# Patient Record
Sex: FEMALE | Race: OTHER | HISPANIC OR LATINO
[De-identification: names, ages, dates, MRNs, and addresses within clinical notes are randomized per-mention and may not be internally consistent; named-entity substitution may affect disease eponyms.]

---

## 2022-01-06 PROBLEM — Z00.00 ENCOUNTER FOR PREVENTIVE HEALTH EXAMINATION: Status: ACTIVE | Noted: 2022-01-06

## 2022-01-10 ENCOUNTER — APPOINTMENT (OUTPATIENT)
Dept: GASTROENTEROLOGY | Facility: CLINIC | Age: 72
End: 2022-01-10

## 2022-05-09 ENCOUNTER — APPOINTMENT (OUTPATIENT)
Dept: GASTROENTEROLOGY | Facility: CLINIC | Age: 72
End: 2022-05-09
Payer: MEDICARE

## 2022-05-09 VITALS
BODY MASS INDEX: 30.48 KG/M2 | SYSTOLIC BLOOD PRESSURE: 132 MMHG | WEIGHT: 172 LBS | HEART RATE: 78 BPM | HEIGHT: 63 IN | TEMPERATURE: 97.7 F | OXYGEN SATURATION: 98 % | DIASTOLIC BLOOD PRESSURE: 71 MMHG | RESPIRATION RATE: 17 BRPM

## 2022-05-09 DIAGNOSIS — Z78.9 OTHER SPECIFIED HEALTH STATUS: ICD-10-CM

## 2022-05-09 DIAGNOSIS — K63.5 POLYP OF COLON: ICD-10-CM

## 2022-05-09 DIAGNOSIS — E11.9 TYPE 2 DIABETES MELLITUS W/OUT COMPLICATIONS: ICD-10-CM

## 2022-05-09 DIAGNOSIS — Z12.11 ENCOUNTER FOR SCREENING FOR MALIGNANT NEOPLASM OF COLON: ICD-10-CM

## 2022-05-09 DIAGNOSIS — I10 ESSENTIAL (PRIMARY) HYPERTENSION: ICD-10-CM

## 2022-05-09 DIAGNOSIS — Z01.812 ENCOUNTER FOR PREPROCEDURAL LABORATORY EXAMINATION: ICD-10-CM

## 2022-05-09 DIAGNOSIS — Z86.79 PERSONAL HISTORY OF OTHER DISEASES OF THE CIRCULATORY SYSTEM: ICD-10-CM

## 2022-05-09 DIAGNOSIS — Z95.5 PRESENCE OF CORONARY ANGIOPLASTY IMPLANT AND GRAFT: ICD-10-CM

## 2022-05-09 DIAGNOSIS — Z82.0 FAMILY HISTORY OF EPILEPSY AND OTHER DISEASES OF THE NERVOUS SYSTEM: ICD-10-CM

## 2022-05-09 DIAGNOSIS — Z20.822 ENCOUNTER FOR PREPROCEDURAL LABORATORY EXAMINATION: ICD-10-CM

## 2022-05-09 PROCEDURE — 99204 OFFICE O/P NEW MOD 45 MIN: CPT

## 2022-05-09 RX ORDER — CLOTRIMAZOLE 10 MG/G
1 CREAM TOPICAL
Qty: 30 | Refills: 0 | Status: ACTIVE | COMMUNITY
Start: 2022-04-22

## 2022-05-09 RX ORDER — CLOPIDOGREL BISULFATE 75 MG/1
75 TABLET, FILM COATED ORAL
Qty: 30 | Refills: 0 | Status: ACTIVE | COMMUNITY
Start: 2021-11-12

## 2022-05-09 RX ORDER — AMITRIPTYLINE HYDROCHLORIDE 25 MG/1
25 TABLET, FILM COATED ORAL
Qty: 30 | Refills: 0 | Status: ACTIVE | COMMUNITY
Start: 2022-04-20

## 2022-05-09 RX ORDER — ATORVASTATIN CALCIUM 40 MG/1
40 TABLET, FILM COATED ORAL
Qty: 30 | Refills: 0 | Status: ACTIVE | COMMUNITY
Start: 2022-04-20

## 2022-05-09 RX ORDER — METOPROLOL SUCCINATE 25 MG/1
25 TABLET, EXTENDED RELEASE ORAL
Qty: 30 | Refills: 0 | Status: ACTIVE | COMMUNITY
Start: 2022-01-22

## 2022-05-09 RX ORDER — LISINOPRIL 5 MG/1
5 TABLET ORAL
Qty: 30 | Refills: 0 | Status: ACTIVE | COMMUNITY
Start: 2022-04-20

## 2022-05-09 RX ORDER — METFORMIN HYDROCHLORIDE 1000 MG/1
1000 TABLET, COATED ORAL
Qty: 60 | Refills: 0 | Status: ACTIVE | COMMUNITY
Start: 2022-04-20

## 2022-05-09 RX ORDER — FAMOTIDINE 40 MG/1
40 TABLET, FILM COATED ORAL
Qty: 60 | Refills: 0 | Status: DISCONTINUED | COMMUNITY
Start: 2022-01-22 | End: 2022-05-09

## 2022-05-09 RX ORDER — MECLIZINE HYDROCHLORIDE 25 MG/1
25 TABLET ORAL
Refills: 0 | Status: ACTIVE | COMMUNITY

## 2022-05-09 RX ORDER — NITROGLYCERIN 20 MG/1
0.1 PATCH TRANSDERMAL
Refills: 0 | Status: ACTIVE | COMMUNITY

## 2022-05-09 RX ORDER — SODIUM SULFATE, POTASSIUM SULFATE, MAGNESIUM SULFATE 17.5; 3.13; 1.6 G/ML; G/ML; G/ML
17.5-3.13-1.6 SOLUTION, CONCENTRATE ORAL
Qty: 1 | Refills: 0 | Status: ACTIVE | COMMUNITY
Start: 2022-05-09 | End: 1900-01-01

## 2022-05-09 NOTE — CONSULT LETTER
[Dear  ___] : Dear  [unfilled], [Consult Letter:] : I had the pleasure of evaluating your patient, [unfilled]. [Please see my note below.] : Please see my note below. [Sincerely,] : Sincerely, [FreeTextEntry3] : Tristin Hightower MD, FACG\par

## 2022-05-09 NOTE — HISTORY OF PRESENT ILLNESS
[Heartburn] : denies heartburn [Nausea] : denies nausea [Vomiting] : denies vomiting [Diarrhea] : denies diarrhea [Constipation] : denies constipation [Yellow Skin Or Eyes (Jaundice)] : denies jaundice [Abdominal Pain] : denies abdominal pain [Abdominal Swelling] : denies abdominal swelling [Wt Gain ___ Lbs] : no recent weight gain [GERD] : no gastroesophageal reflux disease [Hiatus Hernia] : no hiatus hernia [Peptic Ulcer Disease] : no peptic ulcer disease [Pancreatitis] : no pancreatitis [Cholelithiasis] : no cholelithiasis [Kidney Stone] : no kidney stone [Inflammatory Bowel Disease] : no inflammatory bowel disease [Irritable Bowel Syndrome] : no irritable bowel syndrome [Diverticulitis] : no diverticulitis [Alcohol Abuse] : no alcohol abuse [Malignancy] : no malignancy [Abdominal Surgery] : no abdominal surgery [Appendectomy] : no appendectomy [Cholecystectomy] : no cholecystectomy [de-identified] : 71-year-old woman presents for a screening colonoscopy.  Her last colonoscopy approximately 3 years ago she had a colon polyp removed.  She denies rectal bleeding, melena or hematemesis.  She has coronary artery disease with coronary stents for which she is on Clopidogrel.

## 2022-05-09 NOTE — PHYSICAL EXAM
[General Appearance - Alert] : alert [General Appearance - In No Acute Distress] : in no acute distress [Sclera] : the sclera and conjunctiva were normal [PERRL With Normal Accommodation] : pupils were equal in size, round, and reactive to light [Extraocular Movements] : extraocular movements were intact [Outer Ear] : the ears and nose were normal in appearance [Oropharynx] : the oropharynx was normal [Neck Appearance] : the appearance of the neck was normal [Neck Cervical Mass (___cm)] : no neck mass was observed [Jugular Venous Distention Increased] : there was no jugular-venous distention [Thyroid Diffuse Enlargement] : the thyroid was not enlarged [Thyroid Nodule] : there were no palpable thyroid nodules [Auscultation Breath Sounds / Voice Sounds] : lungs were clear to auscultation bilaterally [Heart Rate And Rhythm] : heart rate was normal and rhythm regular [Heart Sounds] : normal S1 and S2 [Heart Sounds Gallop] : no gallops [Murmurs] : no murmurs [Heart Sounds Pericardial Friction Rub] : no pericardial rub [Bowel Sounds] : normal bowel sounds [Abdomen Soft] : soft [Abdomen Tenderness] : non-tender [] : no hepato-splenomegaly [Abdomen Mass (___ Cm)] : no abdominal mass palpated [Cervical Lymph Nodes Enlarged Posterior Bilaterally] : posterior cervical [Cervical Lymph Nodes Enlarged Anterior Bilaterally] : anterior cervical [Supraclavicular Lymph Nodes Enlarged Bilaterally] : supraclavicular [No CVA Tenderness] : no ~M costovertebral angle tenderness [No Spinal Tenderness] : no spinal tenderness [Abnormal Walk] : normal gait [Nail Clubbing] : no clubbing  or cyanosis of the fingernails [Musculoskeletal - Swelling] : no joint swelling seen [Motor Tone] : muscle strength and tone were normal [Deep Tendon Reflexes (DTR)] : deep tendon reflexes were 2+ and symmetric [No Focal Deficits] : no focal deficits [Sensation] : the sensory exam was normal to light touch and pinprick [Impaired Insight] : insight and judgment were intact [Oriented To Time, Place, And Person] : oriented to person, place, and time [Affect] : the affect was normal

## 2022-05-09 NOTE — ASSESSMENT
[FreeTextEntry1] : Screening colonoscopy in this high-risk patient with a history of colon polyps..  The importance of colon cancer screening and the colonoscopy prep was discussed with the patient.  She understands and all questions were answered.\par Coronary artery disease.  Cardiology clearance is requested.

## 2022-09-02 RX ORDER — POLYETHYLENE GLYCOL-3350, SODIUM CHLORIDE, POTASSIUM CHLORIDE AND SODIUM BICARBONATE 420; 11.2; 5.72; 1.48 G/438.4G; G/438.4G; G/438.4G; G/438.4G
420 POWDER, FOR SOLUTION ORAL
Qty: 1 | Refills: 0 | Status: ACTIVE | COMMUNITY
Start: 2022-09-02 | End: 1900-01-01

## 2022-09-16 ENCOUNTER — APPOINTMENT (OUTPATIENT)
Dept: GASTROENTEROLOGY | Facility: CLINIC | Age: 72
End: 2022-09-16

## 2022-09-21 ENCOUNTER — APPOINTMENT (OUTPATIENT)
Dept: GASTROENTEROLOGY | Facility: AMBULATORY MEDICAL SERVICES | Age: 72
End: 2022-09-21

## 2023-02-22 PROBLEM — G93.89 BRAIN MASS: Status: ACTIVE | Noted: 2023-02-22

## 2023-02-22 PROBLEM — D32.9 MENINGIOMA: Status: ACTIVE | Noted: 2023-02-22

## 2023-02-22 RX ORDER — NITROGLYCERIN 20 MG/1
0.1 PATCH TRANSDERMAL
Qty: 30 | Refills: 0 | Status: DISCONTINUED | COMMUNITY
Start: 2022-01-22 | End: 2023-02-22

## 2023-02-23 ENCOUNTER — APPOINTMENT (OUTPATIENT)
Dept: NEUROSURGERY | Facility: CLINIC | Age: 73
End: 2023-02-23
Payer: MEDICARE

## 2023-02-23 VITALS
OXYGEN SATURATION: 98 % | BODY MASS INDEX: 30.83 KG/M2 | HEART RATE: 66 BPM | HEIGHT: 63 IN | DIASTOLIC BLOOD PRESSURE: 66 MMHG | TEMPERATURE: 98 F | WEIGHT: 174 LBS | SYSTOLIC BLOOD PRESSURE: 123 MMHG

## 2023-02-23 DIAGNOSIS — D33.2 BENIGN NEOPLASM OF BRAIN, UNSPECIFIED: ICD-10-CM

## 2023-02-23 DIAGNOSIS — G93.89 OTHER SPECIFIED DISORDERS OF BRAIN: ICD-10-CM

## 2023-02-23 DIAGNOSIS — D32.9 BENIGN NEOPLASM OF MENINGES, UNSPECIFIED: ICD-10-CM

## 2023-02-23 PROCEDURE — 99203 OFFICE O/P NEW LOW 30 MIN: CPT

## 2023-02-23 NOTE — PHYSICAL EXAM
[General Appearance - Alert] : alert [General Appearance - In No Acute Distress] : in no acute distress [General Appearance - Well Nourished] : well nourished [General Appearance - Well Developed] : well developed [Oriented To Time, Place, And Person] : oriented to person, place, and time [Impaired Insight] : insight and judgment were intact [Affect] : the affect was normal [Mood] : the mood was normal [Person] : oriented to person [Place] : oriented to place [Time] : oriented to time [Cranial Nerves Optic (II)] : visual acuity intact bilaterally,  pupils equal round and reactive to light [Cranial Nerves Oculomotor (III)] : extraocular motion intact [Cranial Nerves Trigeminal (V)] : facial sensation intact symmetrically [Cranial Nerves Facial (VII)] : face symmetrical [Cranial Nerves Vestibulocochlear (VIII)] : hearing was intact bilaterally [Cranial Nerves Glossopharyngeal (IX)] : tongue and palate midline [Cranial Nerves Accessory (XI - Cranial And Spinal)] : head turning and shoulder shrug symmetric [Cranial Nerves Hypoglossal (XII)] : there was no tongue deviation with protrusion [Motor Tone] : muscle tone was normal in all four extremities [Motor Strength] : muscle strength was normal in all four extremities [Sensation Tactile Decrease] : light touch was intact [Sclera] : the sclera and conjunctiva were normal [PERRL With Normal Accommodation] : pupils were equal in size, round, reactive to light, with normal accommodation [Extraocular Movements] : extraocular movements were intact [Outer Ear] : the ears and nose were normal in appearance [Hearing Threshold Finger Rub Not [place]] : hearing was normal [Neck Appearance] : the appearance of the neck was normal [] : no respiratory distress [Exaggerated Use Of Accessory Muscles For Inspiration] : no accessory muscle use [Abnormal Walk] : normal gait [Skin Color & Pigmentation] : normal skin color and pigmentation

## 2023-02-27 PROBLEM — D33.2 BRAIN TUMOR (BENIGN): Status: ACTIVE | Noted: 2023-02-27

## 2023-02-27 NOTE — HISTORY OF PRESENT ILLNESS
[FreeTextEntry1] : meningioma  [de-identified] : Patient is a 72 year old female with PMHx THN, DM, CAD on ASA and Plavix, colon polyps, presents for neurosurgical evaluation of brain tumor. Patient states that she was referred to see neurology for evaluation of vertigo and dizziness which led to having MRI brain on 11/30/22 (at [facility]) showing small dural based partially calcified extraaxial mass overlying R cerebellar hemisphere 1.5cm x 1.4cm x 1.2cm most likely consistent with meningioma with focal invasion of R transverse sinus with severe stenosis. Patient takes Meclizine for vertigo, still gets intermittent dizziness, denies headache, vision changes, seizure like activity, weakness, numbness, problem with gait and balance.

## 2023-07-26 ENCOUNTER — OUTPATIENT (OUTPATIENT)
Dept: OUTPATIENT SERVICES | Facility: HOSPITAL | Age: 73
LOS: 1 days | End: 2023-07-26
Payer: COMMERCIAL

## 2023-07-26 VITALS
OXYGEN SATURATION: 98 % | HEART RATE: 74 BPM | TEMPERATURE: 98 F | RESPIRATION RATE: 16 BRPM | SYSTOLIC BLOOD PRESSURE: 111 MMHG | DIASTOLIC BLOOD PRESSURE: 73 MMHG | WEIGHT: 173.94 LBS | HEIGHT: 63 IN

## 2023-07-26 DIAGNOSIS — E11.9 TYPE 2 DIABETES MELLITUS WITHOUT COMPLICATIONS: ICD-10-CM

## 2023-07-26 DIAGNOSIS — Z01.818 ENCOUNTER FOR OTHER PREPROCEDURAL EXAMINATION: ICD-10-CM

## 2023-07-26 DIAGNOSIS — Z98.890 OTHER SPECIFIED POSTPROCEDURAL STATES: Chronic | ICD-10-CM

## 2023-07-26 DIAGNOSIS — I25.10 ATHEROSCLEROTIC HEART DISEASE OF NATIVE CORONARY ARTERY WITHOUT ANGINA PECTORIS: ICD-10-CM

## 2023-07-26 DIAGNOSIS — I10 ESSENTIAL (PRIMARY) HYPERTENSION: ICD-10-CM

## 2023-07-26 DIAGNOSIS — D32.0 BENIGN NEOPLASM OF CEREBRAL MENINGES: ICD-10-CM

## 2023-07-26 DIAGNOSIS — M75.01 ADHESIVE CAPSULITIS OF RIGHT SHOULDER: ICD-10-CM

## 2023-07-26 DIAGNOSIS — Z98.891 HISTORY OF UTERINE SCAR FROM PREVIOUS SURGERY: Chronic | ICD-10-CM

## 2023-07-26 NOTE — H&P PST ADULT - HISTORY OF PRESENT ILLNESS
Right Shoulder Arthroscopy and Rotator Cuff Repair on 8/1/23 with Dr Fam Right Shoulder Arthroscopy and Rotator Cuff Repair on 8/1/23 with Dr Fam    PMHx for DM, HLD, HTN, CAD s/p coronary stents x 3- 2019- on plavix and aspirin 72 y.o female with significant PMHx for DM, HLD, HTN, CAD s/p coronary stents x 3- 2019- on Plavix and aspirin,   Vertigo, Benign Meningioma (Dx 11/2022 on MRI on w/u of Vertigo). c/o of worsening Right Shoulder pain for 6 months.  On w/u found to have Adhesive Capsulitis of Right Shoulder and now presents for presurgical evaluation for schedule   Right Shoulder Arthroscopy and Rotator Cuff Repair on 8/1/23 with Dr Fam

## 2023-07-26 NOTE — H&P PST ADULT - PROBLEM SELECTOR PLAN 3
Pt instructed to continue Lisinopril and metoprolol as prescribed including taking on day of surgery with small sip of water

## 2023-07-26 NOTE — H&P PST ADULT - NEGATIVE NEUROLOGICAL SYMPTOMS
no weakness/no difficulty walking no weakness/no paresthesias/no focal seizures/no loss of sensation/no difficulty walking/no headache

## 2023-07-26 NOTE — H&P PST ADULT - NSANTHOSAYNRD_GEN_A_CORE
No. MEME screening performed.  STOP BANG Legend: 0-2 = LOW Risk; 3-4 = INTERMEDIATE Risk; 5-8 = HIGH Risk

## 2023-07-26 NOTE — H&P PST ADULT - ASSESSMENT
Right Shoulder Arthroscopy and Rotator Cuff Repair on 8/1/23 with Dr Fam 72 y.o female with Adhesive Capsulitis of Right Shoulder and now for schedule Right Shoulder Arthroscopy and Rotator Cuff Repair on 8/1/23 with Dr Cristel mistry score 3

## 2023-07-26 NOTE — H&P PST ADULT - PROBLEM SELECTOR PLAN 5
Vertigo, Benign Meningioma (Dx 11/2022 on MRI on w/u of Vertigo)  pt instructed to f/u with Neurologist after surgery

## 2023-07-26 NOTE — H&P PST ADULT - NS PRO FEM REPRO PAP RESULTS
Patient awake and alert, reports pain much better on discharge-was not rating it on a scale when questioned but said it was unbearable initially and currently much better.  Pain is at the head of his penis and he also is complaining of urgency with the catheter.  Bear ortiz on the entire PACU stay and temp increased to 36.2.     
normal

## 2023-07-26 NOTE — H&P PST ADULT - NEGATIVE ENMT SYMPTOMS
no hearing difficulty/no ear pain/no tinnitus/no vertigo no hearing difficulty/no ear pain/no tinnitus

## 2023-07-26 NOTE — H&P PST ADULT - PROBLEM SELECTOR PLAN 1
Pt schedule for Right Shoulder Arthroscopy and Rotator Cuff Repair on 8/1/23 with Dr Fam  Labs drawn in PCP  Pt Medical clearance     Pt was  instructed to stop aspirin/ecotrin and all over the counter medication including vitamins and herbal supplements one week prior to surgery   Instructions given on the use of 4% chlorhexidine wash and Pt verbalized understanding of same   Pt Instructed to have nothing by mouth starting midnight day before surgery  Patient is to expect a phone call day before surgery between the hours of 430- 630pm giving arrival time for surgery   Written and verbal preoperative instructions given to patient with understanding verbalized.       Patient today with STOP bang score   Low  risk for MEME Pt schedule for Right Shoulder Arthroscopy and Rotator Cuff Repair on 8/1/23 with Dr aFm    Labs, CXR and EKG done by PCP on 7/18/23- will f/u result  Pt was seen by PCP for Medical clearance 7/18/23- will f/u report   Pt was also seen by Cardiologist for cardiac clearance 7/18/23- will /fu report     Pt was  instructed to continue aspirin and all over the counter medication including vitamins and herbal supplements one week prior to surgery   Instructions given on the use of 4% chlorhexidine wash and Pt verbalized understanding of same   Pt Instructed to have nothing by mouth starting midnight day before surgery  Patient is to expect a phone call day before surgery between the hours of 430- 630pm giving arrival time for surgery   Written and verbal preoperative instructions given to patient with understanding verbalized.     Patient today with STOP bang score 3  Low  risk for MEME

## 2023-07-26 NOTE — H&P PST ADULT - PROBLEM SELECTOR PLAN 2
Pt instructed to follow PCP recommendations for when to stop Plavix- per pt she was told to stop 5 days prior to surgery  Pt instructed to continue aspirin as prescribed

## 2023-07-26 NOTE — H&P PST ADULT - NSICDXPASTMEDICALHX_GEN_ALL_CORE_FT
PAST MEDICAL HISTORY:  CAD (coronary artery disease)     DM (diabetes mellitus)     HLD (hyperlipidemia)     HTN (hypertension)      PAST MEDICAL HISTORY:  CAD (coronary artery disease)     Cerebellar meningioma     DM (diabetes mellitus)     HLD (hyperlipidemia)     HTN (hypertension)

## 2023-07-27 PROCEDURE — G0463: CPT

## 2023-07-31 ENCOUNTER — TRANSCRIPTION ENCOUNTER (OUTPATIENT)
Age: 73
End: 2023-07-31

## 2023-08-01 ENCOUNTER — TRANSCRIPTION ENCOUNTER (OUTPATIENT)
Age: 73
End: 2023-08-01

## 2023-08-01 ENCOUNTER — OUTPATIENT (OUTPATIENT)
Dept: OUTPATIENT SERVICES | Facility: HOSPITAL | Age: 73
LOS: 1 days | End: 2023-08-01
Payer: COMMERCIAL

## 2023-08-01 VITALS
RESPIRATION RATE: 16 BRPM | SYSTOLIC BLOOD PRESSURE: 138 MMHG | DIASTOLIC BLOOD PRESSURE: 62 MMHG | OXYGEN SATURATION: 100 % | HEART RATE: 60 BPM | TEMPERATURE: 98 F

## 2023-08-01 VITALS
TEMPERATURE: 98 F | WEIGHT: 173.94 LBS | DIASTOLIC BLOOD PRESSURE: 58 MMHG | RESPIRATION RATE: 16 BRPM | HEIGHT: 63 IN | HEART RATE: 82 BPM | OXYGEN SATURATION: 97 % | SYSTOLIC BLOOD PRESSURE: 117 MMHG

## 2023-08-01 DIAGNOSIS — M75.01 ADHESIVE CAPSULITIS OF RIGHT SHOULDER: ICD-10-CM

## 2023-08-01 DIAGNOSIS — Z98.890 OTHER SPECIFIED POSTPROCEDURAL STATES: Chronic | ICD-10-CM

## 2023-08-01 DIAGNOSIS — Z98.891 HISTORY OF UTERINE SCAR FROM PREVIOUS SURGERY: Chronic | ICD-10-CM

## 2023-08-01 LAB
GLUCOSE BLDC GLUCOMTR-MCNC: 119 MG/DL — HIGH (ref 70–99)
GLUCOSE BLDC GLUCOMTR-MCNC: 185 MG/DL — HIGH (ref 70–99)

## 2023-08-01 PROCEDURE — 29824 SHO ARTHRS SRG DSTL CLAVICLC: CPT | Mod: AS,59,RT

## 2023-08-01 PROCEDURE — 29826 SHO ARTHRS SRG DECOMPRESSION: CPT | Mod: RT

## 2023-08-01 PROCEDURE — 29823 SHO ARTHRS SRG XTNSV DBRDMT: CPT | Mod: AS,59,RT

## 2023-08-01 PROCEDURE — 29826 SHO ARTHRS SRG DECOMPRESSION: CPT | Mod: AS,59,RT

## 2023-08-01 PROCEDURE — 29827 SHO ARTHRS SRG RT8TR CUF RPR: CPT | Mod: AS

## 2023-08-01 PROCEDURE — 88304 TISSUE EXAM BY PATHOLOGIST: CPT

## 2023-08-01 PROCEDURE — C1713: CPT

## 2023-08-01 PROCEDURE — 29824 SHO ARTHRS SRG DSTL CLAVICLC: CPT | Mod: RT

## 2023-08-01 PROCEDURE — 82962 GLUCOSE BLOOD TEST: CPT

## 2023-08-01 PROCEDURE — 88304 TISSUE EXAM BY PATHOLOGIST: CPT | Mod: 26

## 2023-08-01 PROCEDURE — 29827 SHO ARTHRS SRG RT8TR CUF RPR: CPT | Mod: RT

## 2023-08-01 DEVICE — ANCHOR JUGGERKNOT 2.9MM: Type: IMPLANTABLE DEVICE | Site: RIGHT | Status: FUNCTIONAL

## 2023-08-01 RX ORDER — CELECOXIB 200 MG/1
200 CAPSULE ORAL ONCE
Refills: 0 | Status: COMPLETED | OUTPATIENT
Start: 2023-08-01 | End: 2023-08-01

## 2023-08-01 RX ORDER — OXYCODONE AND ACETAMINOPHEN 5; 325 MG/1; MG/1
1 TABLET ORAL
Qty: 28 | Refills: 0
Start: 2023-08-01 | End: 2023-08-07

## 2023-08-01 RX ORDER — FENTANYL CITRATE 50 UG/ML
50 INJECTION INTRAVENOUS
Refills: 0 | Status: DISCONTINUED | OUTPATIENT
Start: 2023-08-01 | End: 2023-08-01

## 2023-08-01 RX ORDER — METOPROLOL TARTRATE 50 MG
1 TABLET ORAL
Refills: 0 | DISCHARGE

## 2023-08-01 RX ORDER — METFORMIN HYDROCHLORIDE 850 MG/1
1 TABLET ORAL
Refills: 0 | DISCHARGE

## 2023-08-01 RX ORDER — SODIUM CHLORIDE 9 MG/ML
3 INJECTION INTRAMUSCULAR; INTRAVENOUS; SUBCUTANEOUS EVERY 8 HOURS
Refills: 0 | Status: DISCONTINUED | OUTPATIENT
Start: 2023-08-01 | End: 2023-08-01

## 2023-08-01 RX ORDER — LISINOPRIL 2.5 MG/1
1 TABLET ORAL
Refills: 0 | DISCHARGE

## 2023-08-01 RX ORDER — ONDANSETRON 8 MG/1
4 TABLET, FILM COATED ORAL ONCE
Refills: 0 | Status: DISCONTINUED | OUTPATIENT
Start: 2023-08-01 | End: 2023-08-01

## 2023-08-01 RX ORDER — CLOPIDOGREL BISULFATE 75 MG/1
1 TABLET, FILM COATED ORAL
Refills: 0 | DISCHARGE

## 2023-08-01 RX ORDER — SODIUM CHLORIDE 9 MG/ML
1000 INJECTION, SOLUTION INTRAVENOUS
Refills: 0 | Status: DISCONTINUED | OUTPATIENT
Start: 2023-08-01 | End: 2023-08-01

## 2023-08-01 RX ORDER — FENTANYL CITRATE 50 UG/ML
25 INJECTION INTRAVENOUS
Refills: 0 | Status: DISCONTINUED | OUTPATIENT
Start: 2023-08-01 | End: 2023-08-01

## 2023-08-01 RX ORDER — ACETAMINOPHEN 500 MG
975 TABLET ORAL ONCE
Refills: 0 | Status: COMPLETED | OUTPATIENT
Start: 2023-08-01 | End: 2023-08-01

## 2023-08-01 RX ORDER — MECLIZINE HCL 12.5 MG
1 TABLET ORAL
Refills: 0 | DISCHARGE

## 2023-08-01 RX ORDER — ASPIRIN/CALCIUM CARB/MAGNESIUM 324 MG
1 TABLET ORAL
Refills: 0 | DISCHARGE

## 2023-08-01 RX ORDER — ATORVASTATIN CALCIUM 80 MG/1
1 TABLET, FILM COATED ORAL
Refills: 0 | DISCHARGE

## 2023-08-01 RX ADMIN — CELECOXIB 200 MILLIGRAM(S): 200 CAPSULE ORAL at 11:26

## 2023-08-01 RX ADMIN — Medication 975 MILLIGRAM(S): at 11:26

## 2023-08-01 NOTE — BRIEF OPERATIVE NOTE - NSICDXBRIEFPROCEDURE_GEN_ALL_CORE_FT
PROCEDURES:  Arthroscopy of right shoulder with repair of rotator cuff 01-Aug-2023 16:12:18  Javon Austin

## 2023-08-01 NOTE — BRIEF OPERATIVE NOTE - NSICDXBRIEFPREOP_GEN_ALL_CORE_FT
PRE-OP DIAGNOSIS:  Rotator cuff tear arthropathy of right shoulder 01-Aug-2023 16:12:33  Javon Austin

## 2023-08-01 NOTE — ASU DISCHARGE PLAN (ADULT/PEDIATRIC) - CARE PROVIDER_API CALL
Neftali Fam  Orthopaedic Surgery  [address] [place] [address], [address] [place] [address]  Phone: [phone]  Fax: [phone]  Follow Up Time:

## 2023-08-01 NOTE — ASU PATIENT PROFILE, ADULT - FALL HARM RISK - UNIVERSAL INTERVENTIONS
Bed in lowest position, wheels locked, appropriate side rails in place/Call bell, personal items and telephone in reach/Instruct patient to call for assistance before getting out of bed or chair/Non-slip footwear when patient is out of bed/[place] to call system/Physically safe environment - no spills, clutter or unnecessary equipment/Purposeful Proactive Rounding/Room/bathroom lighting operational, light cord in reach

## 2023-08-01 NOTE — ASU PATIENT PROFILE, ADULT - PAIN LOCATION
Call your PCP on Monday (primary care doctor) and ask them to organize for you to have lab work in 7 days time, I e next Thursday  They should follow up on results  If you experience nausea, vomiting, fevers, chills or diarrhea please seek urgent medical attention  right shoulder

## 2023-08-01 NOTE — ASU DISCHARGE PLAN (ADULT/PEDIATRIC) - ASU DC SPECIAL INSTRUCTIONSFT
Keep wound/bandage clean, dry and intact. Return to ER if Fever of 101 F or greater develops. Follow-up with Dr. Fam in ONE WEEK at [phone].

## 2023-08-01 NOTE — ASU PATIENT PROFILE, ADULT - ABILITY TO HEAR (WITH HEARING AID OR HEARING APPLIANCE IF NORMALLY USED):
"left ear noise sound"/Mildly to Moderately Impaired: difficulty hearing in some environments or speaker may need to increase volume or speak distinctly

## 2023-08-01 NOTE — ASU PATIENT PROFILE, ADULT - LANGUAGE ASSISTANCE NEEDED
pt. speaks some english  as well/No-Patient/Caregiver offered and refused free interpretation services.

## 2023-08-01 NOTE — ASU PATIENT PROFILE, ADULT - NSICDXPASTMEDICALHX_GEN_ALL_CORE_FT
PAST MEDICAL HISTORY:  CAD (coronary artery disease)     Cerebellar meningioma     DM (diabetes mellitus)     HLD (hyperlipidemia)     HTN (hypertension)

## 2023-08-07 LAB — SURGICAL PATHOLOGY STUDY: SIGNIFICANT CHANGE UP

## (undated) DEVICE — VENODYNE/SCD SLEEVE CALF MEDIUM

## (undated) DEVICE — GOWN XL

## (undated) DEVICE — PACK SHOULDER ARTHROSCOPY

## (undated) DEVICE — CANNULA ARTHREX TWIST IN 8.25MMX7CM

## (undated) DEVICE — POSITIONER FOAM MATTRESS PAD CONVOLUTED (PINK)

## (undated) DEVICE — DRAPE IOBAN 13" X 13"

## (undated) DEVICE — WARMING BLANKET LOWER ADULT

## (undated) DEVICE — SOL INJ LR 1000ML

## (undated) DEVICE — CANNULA HYDDM RGD 7X75MM SNGL

## (undated) DEVICE — TUBING DEPUY MITEK FMS OUTFLOW

## (undated) DEVICE — CANNULA LINVATEC CLEAR FLEXIBLE 6.5X73MM

## (undated) DEVICE — TUBING SUCTION NONCONDUCTIVE 6MM X 12FT

## (undated) DEVICE — GLV 7.5 PROTEXIS (BLUE)

## (undated) DEVICE — HOOD T5 PEELAWAY

## (undated) DEVICE — TUBING DEPUY MITEK FMS INFLOW

## (undated) DEVICE — DRAPE SURGICAL #1010

## (undated) DEVICE — DRAPE 1/2 SHEET 40X57"

## (undated) DEVICE — DRSG KERLIX ROLL 4.5"

## (undated) DEVICE — SHAVER BLADE S&N INCISOR PLUS PLATINUM 4.5MM

## (undated) DEVICE — DRSG STERISTRIPS 0.5 X 4"

## (undated) DEVICE — ARTHREX FINGERSHIELD

## (undated) DEVICE — CANNULA LINVATEC DRY DOC 7MMX85MM W OBTURATOR

## (undated) DEVICE — SUT BIOSYN 3-0 27" V-20

## (undated) DEVICE — S&N ARTHROCARE WAND TURBOVAC 90 DEGREE

## (undated) DEVICE — SUT CONMED SUTURE SAVER KIT

## (undated) DEVICE — DRAPE TOWEL BLUE 17" X 24"

## (undated) DEVICE — DRSG COMBINE 5X9"

## (undated) DEVICE — SHAVER BLADE S&N INCISOR 4.5MM CURVED (SLATE)

## (undated) DEVICE — SUT PASSER ARTHROCARE FIRST PASS ST SELF CAPTURE

## (undated) DEVICE — SUT PASSER CONMED SUPERSHUTTLE

## (undated) DEVICE — ARTHREX STAR SLEEVE COBAN

## (undated) DEVICE — SHAVER BLADE S&N FULL RADIUS BONECUTTER PLATINUM 4.5MM (YELLOW)

## (undated) DEVICE — DRAPE U POLY BLUE 60X72"

## (undated) DEVICE — DRSG MEDIPORE DRESS IT 7-7/8 X 11"

## (undated) DEVICE — GLV 7.5 PROTEXIS ORTHO (CREAM)